# Patient Record
Sex: MALE | Race: WHITE
[De-identification: names, ages, dates, MRNs, and addresses within clinical notes are randomized per-mention and may not be internally consistent; named-entity substitution may affect disease eponyms.]

---

## 2017-10-09 ENCOUNTER — HOSPITAL ENCOUNTER (OUTPATIENT)
Dept: HOSPITAL 53 - M RAD | Age: 52
End: 2017-10-09
Attending: NEUROLOGICAL SURGERY
Payer: COMMERCIAL

## 2017-10-09 DIAGNOSIS — D35.2: Primary | ICD-10-CM

## 2017-10-09 PROCEDURE — 70553 MRI BRAIN STEM W/O & W/DYE: CPT

## 2017-10-09 NOTE — REP
MR BRAIN WITHOUT AND WITH CONTRAST:

 

HISTORY:  Pituitary adenoma.

 

CONTRAST:  ProHance 9 mL.

 

COMPARISON:  11/20/2007 and 08/21/2015.

 

There are no areas of abnormal signal intensity in the brain parenchyma.  There

is no intraparenchymal hemorrhage, infarct or midline shift.  The ventricular

system is normal in appearance.  There is no extracerebral collection.  The

patient is status post transsphenoidal__ resection of a pituitary adenoma.  The

sella turcica is enlarged.  A small ill-defined area of decreased signal

intensity on contrast enhanced T1-weighted images is present in the left side of

the pituitary gland.  There is minimal enlargement of the residual pituitary

tissue in this area. There is no extension into the suprasellar cistern.  There

is retraction of the infundibulum to the right.  The cavernous sinuses are normal

in appearance. The optic chiasm is atrophic.  Mucosal thickening is present in

the maxillary sinuses.

 

IMPRESSION:

 

There is a small defined area of decreased signa intensity on contrast-enhanced

T1 weighted images in the left side of the pituitary gland suspicious for

recurrent tumor.

 

 

Signed by

Yoel Suh MD 10/09/2017 11:22 A